# Patient Record
Sex: FEMALE | Race: WHITE | NOT HISPANIC OR LATINO | Employment: FULL TIME | URBAN - METROPOLITAN AREA
[De-identification: names, ages, dates, MRNs, and addresses within clinical notes are randomized per-mention and may not be internally consistent; named-entity substitution may affect disease eponyms.]

---

## 2020-08-05 ENCOUNTER — HOSPITAL ENCOUNTER (EMERGENCY)
Facility: MEDICAL CENTER | Age: 35
End: 2020-08-05
Attending: EMERGENCY MEDICINE
Payer: COMMERCIAL

## 2020-08-05 ENCOUNTER — APPOINTMENT (OUTPATIENT)
Dept: RADIOLOGY | Facility: MEDICAL CENTER | Age: 35
End: 2020-08-05
Attending: EMERGENCY MEDICINE
Payer: COMMERCIAL

## 2020-08-05 VITALS
TEMPERATURE: 99 F | WEIGHT: 180.34 LBS | RESPIRATION RATE: 18 BRPM | OXYGEN SATURATION: 97 % | DIASTOLIC BLOOD PRESSURE: 87 MMHG | HEART RATE: 93 BPM | HEIGHT: 63 IN | BODY MASS INDEX: 31.95 KG/M2 | SYSTOLIC BLOOD PRESSURE: 153 MMHG

## 2020-08-05 DIAGNOSIS — O41.8X12 SUBCHORIONIC HEMATOMA IN FIRST TRIMESTER, FETUS 2 OF MULTIPLE GESTATION: ICD-10-CM

## 2020-08-05 DIAGNOSIS — O20.9 FIRST TRIMESTER BLEEDING: ICD-10-CM

## 2020-08-05 DIAGNOSIS — N93.9 VAGINAL BLEEDING: ICD-10-CM

## 2020-08-05 DIAGNOSIS — O46.8X1 SUBCHORIONIC HEMATOMA IN FIRST TRIMESTER, FETUS 2 OF MULTIPLE GESTATION: ICD-10-CM

## 2020-08-05 LAB
ALBUMIN SERPL BCP-MCNC: 4.1 G/DL (ref 3.2–4.9)
ALBUMIN/GLOB SERPL: 1.3 G/DL
ALP SERPL-CCNC: 82 U/L (ref 30–99)
ALT SERPL-CCNC: 12 U/L (ref 2–50)
ANION GAP SERPL CALC-SCNC: 15 MMOL/L (ref 7–16)
AST SERPL-CCNC: 15 U/L (ref 12–45)
B-HCG SERPL-ACNC: ABNORMAL MIU/ML (ref 0–5)
BASOPHILS # BLD AUTO: 0.3 % (ref 0–1.8)
BASOPHILS # BLD: 0.05 K/UL (ref 0–0.12)
BILIRUB SERPL-MCNC: 0.3 MG/DL (ref 0.1–1.5)
BUN SERPL-MCNC: 6 MG/DL (ref 8–22)
CALCIUM SERPL-MCNC: 9.4 MG/DL (ref 8.5–10.5)
CHLORIDE SERPL-SCNC: 102 MMOL/L (ref 96–112)
CO2 SERPL-SCNC: 21 MMOL/L (ref 20–33)
CREAT SERPL-MCNC: 0.64 MG/DL (ref 0.5–1.4)
EOSINOPHIL # BLD AUTO: 0.11 K/UL (ref 0–0.51)
EOSINOPHIL NFR BLD: 0.7 % (ref 0–6.9)
ERYTHROCYTE [DISTWIDTH] IN BLOOD BY AUTOMATED COUNT: 39.8 FL (ref 35.9–50)
GLOBULIN SER CALC-MCNC: 3.2 G/DL (ref 1.9–3.5)
GLUCOSE SERPL-MCNC: 83 MG/DL (ref 65–99)
HCT VFR BLD AUTO: 43.3 % (ref 37–47)
HGB BLD-MCNC: 14.5 G/DL (ref 12–16)
IMM GRANULOCYTES # BLD AUTO: 0.05 K/UL (ref 0–0.11)
IMM GRANULOCYTES NFR BLD AUTO: 0.3 % (ref 0–0.9)
LYMPHOCYTES # BLD AUTO: 2.59 K/UL (ref 1–4.8)
LYMPHOCYTES NFR BLD: 17.5 % (ref 22–41)
MCH RBC QN AUTO: 27.7 PG (ref 27–33)
MCHC RBC AUTO-ENTMCNC: 33.5 G/DL (ref 33.6–35)
MCV RBC AUTO: 82.8 FL (ref 81.4–97.8)
MONOCYTES # BLD AUTO: 1.08 K/UL (ref 0–0.85)
MONOCYTES NFR BLD AUTO: 7.3 % (ref 0–13.4)
NEUTROPHILS # BLD AUTO: 10.94 K/UL (ref 2–7.15)
NEUTROPHILS NFR BLD: 73.9 % (ref 44–72)
NRBC # BLD AUTO: 0 K/UL
NRBC BLD-RTO: 0 /100 WBC
NUMBER OF RH DOSES IND 8505RD: 1
PLATELET # BLD AUTO: 238 K/UL (ref 164–446)
PMV BLD AUTO: 11.2 FL (ref 9–12.9)
POTASSIUM SERPL-SCNC: 4 MMOL/L (ref 3.6–5.5)
PROT SERPL-MCNC: 7.3 G/DL (ref 6–8.2)
RBC # BLD AUTO: 5.23 M/UL (ref 4.2–5.4)
RH BLD: NORMAL
SODIUM SERPL-SCNC: 138 MMOL/L (ref 135–145)
WBC # BLD AUTO: 14.8 K/UL (ref 4.8–10.8)

## 2020-08-05 PROCEDURE — 85025 COMPLETE CBC W/AUTO DIFF WBC: CPT

## 2020-08-05 PROCEDURE — 99284 EMERGENCY DEPT VISIT MOD MDM: CPT

## 2020-08-05 PROCEDURE — 76801 OB US < 14 WKS SINGLE FETUS: CPT

## 2020-08-05 PROCEDURE — 84702 CHORIONIC GONADOTROPIN TEST: CPT

## 2020-08-05 PROCEDURE — 80053 COMPREHEN METABOLIC PANEL: CPT

## 2020-08-05 PROCEDURE — 86901 BLOOD TYPING SEROLOGIC RH(D): CPT

## 2020-08-05 RX ORDER — ASPIRIN 81 MG/1
81 TABLET, CHEWABLE ORAL DAILY
COMMUNITY

## 2020-08-06 NOTE — ED NOTES
Pt ambulates to triage with c/c vaginal bleeding & cramping.  Pt reports being 12 weeks pregnant with twins.  .  A&ox4.  Mask on.  Pt to lobby & advised to inform RN of any changes.    Pt lives in [place]  Pt denies any known exposure to covid19.

## 2020-08-06 NOTE — ED PROVIDER NOTES
ED Provider Note    CHIEF COMPLAINT  Chief Complaint   Patient presents with   • Vaginal Bleeding     12 weeks pregnant with twins, reports bright red bleeding    • Abdominal Cramping       HPI  Mary Cox is a 34 y.o. female who presents with abdominal cramping and bright red blood vaginal bleeding.  She states that she had an episode of vaginal bleeding in late July almost 2 weeks ago.  She states that she was examined and everything seemed to be okay.  The bleeding seems to lessen and had only mild spotting yesterday until today she developed increased cramping and increased bright red blood.    She is from [place] and visiting here.  She has an OB in [place].  She states that this is an IVF pregnancy and had a single embryo pregnancy that developed into twins.  This is her fifth pregnancy with 1  3 miscarriages.    No chest pain or trouble breathing.  No nausea or vomiting.  No dysuria or hematuria.  No diarrhea or constipation or bloody stool or black stool.  Denies prior abdominal surgeries in the past.    Patient notes that she is Rh- and has received RhoGam earlier this pregnancy.        REVIEW OF SYSTEMS  See HPI for further details. All other systems are negative.     PAST MEDICAL HISTORY       SOCIAL HISTORY  Social History     Tobacco Use   • Smoking status: Not on file   Substance and Sexual Activity   • Alcohol use: Not on file   • Drug use: Not on file   • Sexual activity: Not on file       SURGICAL HISTORY  patient denies any surgical history    CURRENT MEDICATIONS  Home Medications     Reviewed by Francie Schmidt R.N. (Registered Nurse) on 20 at 1821  Med List Status: Partial   Medication Last Dose Status   aspirin (ASA) 81 MG Chew Tab chewable tablet  Active   FOLIC ACID PO  Active                ALLERGIES  Allergies   Allergen Reactions   • Vancomycin        PHYSICAL EXAM  VITAL SIGNS: /87   Pulse 93   Temp 37.2 °C (99 °F) (Temporal)   Resp 18   Ht 1.6  "m (5' 3\")   Wt 81.8 kg (180 lb 5.4 oz)   SpO2 97%   BMI 31.95 kg/m²   Pulse ox interpretation: I interpret this pulse ox as normal.  Constitutional: Alert in no apparent distress.  HENT: No signs of trauma, Bilateral external ears normal, Nose normal.   Eyes: Pupils are equal and reactive, Conjunctiva normal, Non-icteric.   Cardiovascular: Regular rate and rhythm.   Thorax & Lungs: Normal breath sounds, No respiratory distress, No wheezing, No chest tenderness.   Abdomen: Bowel sounds normal, Soft, mild diffuse abdominal tenderness, No masses, No pulsatile masses. No peritoneal signs.  Skin: Warm, Dry, No erythema, No rash.   Extremities: Intact distal pulses, No edema, No tenderness, No cyanosis  Neurologic: Alert, No focal deficits noted.       DIAGNOSTIC STUDIES / PROCEDURES    LABS  Labs Reviewed   CBC WITH DIFFERENTIAL - Abnormal; Notable for the following components:       Result Value    WBC 14.8 (*)     MCHC 33.5 (*)     Neutrophils-Polys 73.90 (*)     Lymphocytes 17.50 (*)     Neutrophils (Absolute) 10.94 (*)     Monos (Absolute) 1.08 (*)     All other components within normal limits   COMP METABOLIC PANEL - Abnormal; Notable for the following components:    Bun 6 (*)     All other components within normal limits   HCG QUANTITATIVE - Abnormal; Notable for the following components:    Bhcg 123767.0 (*)     All other components within normal limits   RH TYPE FOR RHOGAM FROM E.D.    Narrative:     Print Consent?->No   ESTIMATED GFR   ACTION: RH IMMUNE GLOBULIN    Narrative:     Print Consent?->No       RADIOLOGY  US-OB TWINS-1ST TRIMESTER   Final Result      Viable twin intrauterine gestation of an estimated gestational age 12 weeks 3 days and estimated due date 2/14/2021.      Probable small subchorionic hemorrhage.            COURSE & MEDICAL DECISION MAKING    Medications - No data to display    Pertinent Labs & Imaging studies reviewed. (See chart for details)  34 y.o. female presenting with pelvic " "cramping and abdominal pain with vaginal bleeding.  Has had constant vaginal bleeding that initially started rather briskly with bright red blood approximately a week and a half ago.  Since then has had minimal vaginal spotting.  Had increase in vaginal bleeding today with worsening cramping and so she came in for further evaluation.  At the onset of vaginal bleeding, she was evaluated at in [place] where she typically lives -she is visiting the area around [place] temporarily.  She is followed by an OB in [place].  This is a desired pregnancy via IVF.    Laboratory studies were performed.  Patient does not appear to be significantly anemic.  Hemodynamically stable.  Leukocytosis may be a result of her pregnant state.  No signs of underlying infectious etiology.  Patient is Rh- however she did receive RhoGam within the past 2 weeks when vaginal bleeding for started.    Pelvic ultrasound was performed.  Showed viable twin IUP with probable small subchorionic hemorrhage.  Patient was informed regarding the results.  She remains stable and in no distress.  No acute changes in her vaginal bleeding.  Recommending continued outpatient management and pelvic rest.  No indication for further emergency intervention at this time.  Likely has threatened miscarriage in the setting of first trimester twin gestation with small subchorionic hemorrhage.  There is still likelihood that the patient can carry to term.  She was encouraged to continue follow-up with OB/GYN in [place] for further management..    The patient was instructed to follow-up with primary care physician for further management.  To return immediately for any worsening symptoms or development of any other concerning signs or symptoms. The patient verbalizes understanding in their own words.    /87   Pulse 93   Temp 37.2 °C (99 °F) (Temporal)   Resp 18   Ht 1.6 m (5' 3\")   Wt 81.8 kg (180 lb 5.4 oz)   SpO2 97%   BMI 31.95 kg/m²     The patient was " referred to primary care where they will receive further BP management.        FINAL IMPRESSION  1. Vaginal bleeding    2. First trimester bleeding    3. Subchorionic hematoma in first trimester, fetus 2 of multiple gestation            Electronically signed by: Fabrizio Casas M.D., 8/5/2020 6:43 PM

## 2021-02-06 ENCOUNTER — APPOINTMENT (OUTPATIENT)
Dept: URGENT CARE | Age: 36
End: 2021-02-06
Attending: INTERNAL MEDICINE